# Patient Record
Sex: MALE | Race: BLACK OR AFRICAN AMERICAN | NOT HISPANIC OR LATINO
[De-identification: names, ages, dates, MRNs, and addresses within clinical notes are randomized per-mention and may not be internally consistent; named-entity substitution may affect disease eponyms.]

---

## 2017-01-01 ENCOUNTER — CHARTING TRANS (OUTPATIENT)
Dept: OTHER | Age: 57
End: 2017-01-01

## 2017-03-27 ENCOUNTER — HOSPITAL (OUTPATIENT)
Dept: OTHER | Age: 57
End: 2017-03-27

## 2017-03-27 LAB
ANALYZER ANC (IANC): ABNORMAL
BASOPHILS # BLD: 0 THOUSAND/MCL (ref 0–0.3)
BASOPHILS NFR BLD: 0 %
DIFFERENTIAL METHOD BLD: ABNORMAL
EOSINOPHIL # BLD: 0 THOUSAND/MCL (ref 0.1–0.5)
EOSINOPHIL NFR BLD: 1 %
ERYTHROCYTE [DISTWIDTH] IN BLOOD: 15.1 % (ref 11–15)
HEMATOCRIT: 36.5 % (ref 39–51)
HGB BLD-MCNC: 12 GM/DL (ref 13–17)
LYMPHOCYTES # BLD: 1 THOUSAND/MCL (ref 1–4)
LYMPHOCYTES NFR BLD: 24 %
MCH RBC QN AUTO: 30.1 PG (ref 26–34)
MCHC RBC AUTO-ENTMCNC: 32.9 GM/DL (ref 32–36.5)
MCV RBC AUTO: 91.5 FL (ref 78–100)
MONOCYTES # BLD: 0.6 THOUSAND/MCL (ref 0.3–0.9)
MONOCYTES NFR BLD: 15 %
NEUTROPHILS # BLD: 2.5 THOUSAND/MCL (ref 1.8–7.7)
NEUTROPHILS NFR BLD: 60 %
NEUTS SEG NFR BLD: ABNORMAL %
PERCENT NRBC: ABNORMAL
PLATELET # BLD: 168 THOUSAND/MCL (ref 140–450)
RBC # BLD: 3.99 MILLION/MCL (ref 4.5–5.9)
WBC # BLD: 4.1 THOUSAND/MCL (ref 4.2–11)

## 2017-04-24 LAB
ANALYZER ANC (IANC): ABNORMAL
ANION GAP SERPL CALC-SCNC: 20 MMOL/L (ref 10–20)
BASOPHILS # BLD: 0.1 THOUSAND/MCL (ref 0–0.3)
BASOPHILS NFR BLD: 1 %
BUN SERPL-MCNC: 17 MG/DL (ref 6–20)
BUN/CREAT SERPL: 15 (ref 7–25)
CALCIUM SERPL-MCNC: 8.1 MG/DL (ref 8.4–10.2)
CHLORIDE: 103 MMOL/L (ref 98–107)
CO2 SERPL-SCNC: 22 MMOL/L (ref 21–32)
CREAT SERPL-MCNC: 1.16 MG/DL (ref 0.67–1.17)
DIFFERENTIAL METHOD BLD: ABNORMAL
EOSINOPHIL # BLD: 0 THOUSAND/MCL (ref 0.1–0.5)
EOSINOPHIL NFR BLD: 0 %
ERYTHROCYTE [DISTWIDTH] IN BLOOD: 14.8 % (ref 11–15)
GLUCOSE SERPL-MCNC: 111 MG/DL (ref 65–99)
HEMATOCRIT: 39 % (ref 39–51)
HGB BLD-MCNC: 13.9 GM/DL (ref 13–17)
LIPASE SERPL-CCNC: 506 UNIT/L (ref 73–393)
LYMPHOCYTES # BLD: 0.9 THOUSAND/MCL (ref 1–4)
LYMPHOCYTES NFR BLD: 18 %
MCH RBC QN AUTO: 32.3 PG (ref 26–34)
MCHC RBC AUTO-ENTMCNC: 35.6 GM/DL (ref 32–36.5)
MCV RBC AUTO: 90.7 FL (ref 78–100)
MONOCYTES # BLD: 0.5 THOUSAND/MCL (ref 0.3–0.9)
MONOCYTES NFR BLD: 11 %
NEUTROPHILS # BLD: 3.4 THOUSAND/MCL (ref 1.8–7.7)
NEUTROPHILS NFR BLD: 70 %
NEUTS SEG NFR BLD: ABNORMAL %
PERCENT NRBC: ABNORMAL
PLATELET # BLD: 175 THOUSAND/MCL (ref 140–450)
POTASSIUM SERPL-SCNC: 3.2 MMOL/L (ref 3.4–5.1)
RBC # BLD: 4.3 MILLION/MCL (ref 4.5–5.9)
SODIUM SERPL-SCNC: 142 MMOL/L (ref 135–145)
TROPONIN I SERPL HS-MCNC: 0.08 NG/ML
WBC # BLD: 4.9 THOUSAND/MCL (ref 4.2–11)

## 2017-04-25 ENCOUNTER — CHARTING TRANS (OUTPATIENT)
Dept: OTHER | Age: 57
End: 2017-04-25

## 2017-04-25 ENCOUNTER — DIAGNOSTIC TRANS (OUTPATIENT)
Dept: OTHER | Age: 57
End: 2017-04-25

## 2017-04-25 ENCOUNTER — HOSPITAL (OUTPATIENT)
Dept: OTHER | Age: 57
End: 2017-04-25
Attending: INTERNAL MEDICINE

## 2017-04-25 LAB
ANALYZER ANC (IANC): ABNORMAL
ANION GAP SERPL CALC-SCNC: 16 MMOL/L (ref 10–20)
APTT PPP: 26 SECONDS (ref 22–30)
APTT PPP: NORMAL S
BASOPHILS # BLD: 0 THOUSAND/MCL (ref 0–0.3)
BASOPHILS NFR BLD: 0 %
BNP SERPL-MCNC: 217 PG/ML
BUN SERPL-MCNC: 16 MG/DL (ref 6–20)
BUN/CREAT SERPL: 15 (ref 7–25)
CALCIUM SERPL-MCNC: 8.1 MG/DL (ref 8.4–10.2)
CHLORIDE: 105 MMOL/L (ref 98–107)
CO2 SERPL-SCNC: 25 MMOL/L (ref 21–32)
CREAT SERPL-MCNC: 1.07 MG/DL (ref 0.67–1.17)
DIFFERENTIAL METHOD BLD: ABNORMAL
EOSINOPHIL # BLD: 0.1 THOUSAND/MCL (ref 0.1–0.5)
EOSINOPHIL NFR BLD: 1 %
ERYTHROCYTE [DISTWIDTH] IN BLOOD: 14.6 % (ref 11–15)
GLUCOSE SERPL-MCNC: 116 MG/DL (ref 65–99)
HEMATOCRIT: 35.2 % (ref 39–51)
HGB BLD-MCNC: 12.1 GM/DL (ref 13–17)
INR PPP: 1.1
LYMPHOCYTES # BLD: 0.9 THOUSAND/MCL (ref 1–4)
LYMPHOCYTES NFR BLD: 23 %
MCH RBC QN AUTO: 31.7 PG (ref 26–34)
MCHC RBC AUTO-ENTMCNC: 34.4 GM/DL (ref 32–36.5)
MCV RBC AUTO: 92.1 FL (ref 78–100)
MONOCYTES # BLD: 0.8 THOUSAND/MCL (ref 0.3–0.9)
MONOCYTES NFR BLD: 19 %
NEUTROPHILS # BLD: 2.3 THOUSAND/MCL (ref 1.8–7.7)
NEUTROPHILS NFR BLD: 57 %
NEUTS SEG NFR BLD: ABNORMAL %
PERCENT NRBC: ABNORMAL
PLATELET # BLD: 136 THOUSAND/MCL (ref 140–450)
POTASSIUM SERPL-SCNC: 3.5 MMOL/L (ref 3.4–5.1)
PROTHROMBIN TIME: 11.5 SECONDS (ref 9.7–11.8)
PROTHROMBIN TIME: NORMAL
RBC # BLD: 3.82 MILLION/MCL (ref 4.5–5.9)
SODIUM SERPL-SCNC: 142 MMOL/L (ref 135–145)
TROPONIN I SERPL HS-MCNC: 1.05 NG/ML
TROPONIN I SERPL HS-MCNC: 1.76 NG/ML
WBC # BLD: 4.1 THOUSAND/MCL (ref 4.2–11)

## 2017-04-26 LAB
ANION GAP SERPL CALC-SCNC: 13 MMOL/L (ref 10–20)
BUN SERPL-MCNC: 9 MG/DL (ref 6–20)
BUN/CREAT SERPL: 8 (ref 7–25)
CALCIUM SERPL-MCNC: 7.4 MG/DL (ref 8.4–10.2)
CHLORIDE: 107 MMOL/L (ref 98–107)
CO2 SERPL-SCNC: 24 MMOL/L (ref 21–32)
CREAT SERPL-MCNC: 1.06 MG/DL (ref 0.67–1.17)
GLUCOSE SERPL-MCNC: 111 MG/DL (ref 65–99)
HEMATOCRIT: 32.4 % (ref 39–51)
HGB BLD-MCNC: 11 GM/DL (ref 13–17)
POTASSIUM SERPL-SCNC: 3.1 MMOL/L (ref 3.4–5.1)
SODIUM SERPL-SCNC: 141 MMOL/L (ref 135–145)

## 2017-04-27 ENCOUNTER — DIAGNOSTIC TRANS (OUTPATIENT)
Dept: OTHER | Age: 57
End: 2017-04-27

## 2017-05-04 ENCOUNTER — CHARTING TRANS (OUTPATIENT)
Dept: OTHER | Age: 57
End: 2017-05-04

## 2017-07-07 ENCOUNTER — HOSPITAL (OUTPATIENT)
Dept: OTHER | Age: 57
End: 2017-07-07
Attending: SURGERY

## 2017-07-07 ENCOUNTER — CHARTING TRANS (OUTPATIENT)
Dept: OTHER | Age: 57
End: 2017-07-07

## 2017-07-07 ASSESSMENT — PAIN SCALES - GENERAL: PAINLEVEL_OUTOF10: 0

## 2017-07-13 ENCOUNTER — CHARTING TRANS (OUTPATIENT)
Dept: OTHER | Age: 57
End: 2017-07-13

## 2017-07-16 ENCOUNTER — CHARTING TRANS (OUTPATIENT)
Dept: OTHER | Age: 57
End: 2017-07-16

## 2018-01-01 ENCOUNTER — HOSPITAL (OUTPATIENT)
Dept: OTHER | Age: 58
End: 2018-01-01
Attending: INTERNAL MEDICINE

## 2018-01-01 ENCOUNTER — IMAGING SERVICES (OUTPATIENT)
Dept: OTHER | Age: 58
End: 2018-01-01

## 2018-01-01 ENCOUNTER — CHARTING TRANS (OUTPATIENT)
Dept: OTHER | Age: 58
End: 2018-01-01

## 2018-01-01 ENCOUNTER — HOSPITAL (OUTPATIENT)
Dept: OTHER | Age: 58
End: 2018-01-01
Attending: RADIOLOGY

## 2018-01-01 ENCOUNTER — LAB SERVICES (OUTPATIENT)
Dept: OTHER | Age: 58
End: 2018-01-01

## 2018-01-01 ENCOUNTER — HOSPITAL (OUTPATIENT)
Dept: OTHER | Age: 58
End: 2018-01-01

## 2018-01-01 ENCOUNTER — DIAGNOSTIC TRANS (OUTPATIENT)
Dept: OTHER | Age: 58
End: 2018-01-01

## 2018-01-01 LAB
ADV 40+41 FIB PROT STL QL NAA+PROBE: NOT DETECTED
ALBUMIN SERPL-MCNC: 2.1 GM/DL (ref 3.6–5.1)
ALBUMIN SERPL-MCNC: 2.4 GM/DL (ref 3.6–5.1)
ALBUMIN SERPL-MCNC: 2.6 GM/DL (ref 3.6–5.1)
ALBUMIN SERPL-MCNC: 2.6 GM/DL (ref 3.6–5.1)
ALBUMIN SERPL-MCNC: 3 GM/DL (ref 3.6–5.1)
ALBUMIN SERPL-MCNC: 3 GM/DL (ref 3.6–5.1)
ALBUMIN SERPL-MCNC: 3.2 GM/DL (ref 3.6–5.1)
ALBUMIN/GLOB SERPL: 0.4 {RATIO} (ref 1–2.4)
ALBUMIN/GLOB SERPL: 0.5 {RATIO} (ref 1–2.4)
ALBUMIN/GLOB SERPL: 0.7 {RATIO} (ref 1–2.4)
ALBUMIN/GLOB SERPL: 0.7 {RATIO} (ref 1–2.4)
ALP SERPL-CCNC: 115 UNIT/L (ref 45–117)
ALP SERPL-CCNC: 119 UNIT/L (ref 45–117)
ALP SERPL-CCNC: 120 UNIT/L (ref 45–117)
ALP SERPL-CCNC: 137 UNIT/L (ref 45–117)
ALP SERPL-CCNC: 149 UNIT/L (ref 45–117)
ALP SERPL-CCNC: 149 UNIT/L (ref 45–117)
ALP SERPL-CCNC: 154 UNIT/L (ref 45–117)
ALT SERPL-CCNC: 11 UNIT/L
ALT SERPL-CCNC: 12 UNIT/L
ALT SERPL-CCNC: 13 UNIT/L
ALT SERPL-CCNC: 15 UNIT/L
ALT SERPL-CCNC: 24 UNIT/L
ALT SERPL-CCNC: 32 UNIT/L
ALT SERPL-CCNC: 36 UNIT/L
ANALYZER ANC (IANC): ABNORMAL
ANION GAP SERPL CALC-SCNC: 11 MMOL/L (ref 10–20)
ANION GAP SERPL CALC-SCNC: 12 MMOL/L (ref 10–20)
ANION GAP SERPL CALC-SCNC: 12 MMOL/L (ref 10–20)
ANION GAP SERPL CALC-SCNC: 13 MMOL/L (ref 10–20)
ANION GAP SERPL CALC-SCNC: 13 MMOL/L (ref 10–20)
ANION GAP SERPL CALC-SCNC: 14 MMOL/L (ref 10–20)
ANION GAP SERPL CALC-SCNC: 15 MMOL/L (ref 10–20)
ANION GAP SERPL CALC-SCNC: 16 MMOL/L (ref 10–20)
ANION GAP SERPL CALC-SCNC: 16 MMOL/L (ref 10–20)
ANION GAP SERPL CALC-SCNC: 17 MMOL/L (ref 10–20)
ANION GAP SERPL CALC-SCNC: 18 MMOL/L (ref 10–20)
ANION GAP SERPL CALC-SCNC: 19 MMOL/L (ref 10–20)
ANION GAP SERPL CALC-SCNC: 21 MMOL/L (ref 10–20)
APPEARANCE FLD: ABNORMAL
APPEARANCE FLD: NORMAL
APTT PPP: 25 SECONDS (ref 22–30)
APTT PPP: 26 SECONDS (ref 22–30)
APTT PPP: 28 SECONDS (ref 22–30)
APTT PPP: 28 SECONDS (ref 22–30)
APTT PPP: 29 SECONDS (ref 22–30)
APTT PPP: 29 SECONDS (ref 22–30)
APTT PPP: 31 SECONDS (ref 22–30)
APTT PPP: 34 SECONDS (ref 22–30)
APTT PPP: 38 SECONDS (ref 22–30)
APTT PPP: 38 SECONDS (ref 22–30)
APTT PPP: 54 SECONDS (ref 22–30)
APTT PPP: ABNORMAL S
APTT PPP: NORMAL S
AST SERPL-CCNC: 18 UNIT/L
AST SERPL-CCNC: 19 UNIT/L
AST SERPL-CCNC: 22 UNIT/L
AST SERPL-CCNC: 28 UNIT/L
AST SERPL-CCNC: 34 UNIT/L
AST SERPL-CCNC: 76 UNIT/L
AST SERPL-CCNC: 85 UNIT/L
BASE DEFICIT BLDA-SCNC: 1 MMOL/L (ref 0–2)
BASE DEFICIT BLDA-SCNC: 2 MMOL/L (ref 0–2)
BASE EXCESS BLDA CALC-SCNC: ABNORMAL MMOL/L
BASE EXCESS BLDA CALC-SCNC: ABNORMAL MMOL/L
BASOPHILS # BLD: 0 THOUSAND/MCL (ref 0–0.3)
BASOPHILS # BLD: 0.1 K/MCL (ref 0–0.3)
BASOPHILS # BLD: 0.1 THOUSAND/MCL (ref 0–0.3)
BASOPHILS NFR BLD: 0 %
BASOPHILS NFR BLD: 1 %
BASOPHILS NFR BRONCH MANUAL: ABNORMAL %
BASOPHILS NFR BRONCH MANUAL: NORMAL %
BDY SITE: ABNORMAL
BDY SITE: ABNORMAL
BILIRUB CONJ SERPL-MCNC: 0.1 MG/DL (ref 0–0.2)
BILIRUB SERPL-MCNC: 0.2 MG/DL (ref 0.2–1)
BILIRUB SERPL-MCNC: 0.3 MG/DL (ref 0.2–1)
BILIRUB SERPL-MCNC: 0.3 MG/DL (ref 0.2–1)
BILIRUB SERPL-MCNC: 0.4 MG/DL (ref 0.2–1)
BILIRUB SERPL-MCNC: 0.5 MG/DL (ref 0.2–1)
BILIRUB SERPL-MCNC: 0.6 MG/DL (ref 0.2–1)
BNP SERPL-MCNC: 27 PG/ML
BNP SERPL-MCNC: 31 PG/ML
BNP SERPL-MCNC: 65 PG/ML
BODY TEMPERATURE: 37 DEGREES
BODY TEMPERATURE: 37 DEGREES
BUN SERPL-MCNC: 10 MG/DL (ref 6–20)
BUN SERPL-MCNC: 11 MG/DL (ref 6–20)
BUN SERPL-MCNC: 12 MG/DL (ref 6–20)
BUN SERPL-MCNC: 13 MG/DL (ref 6–20)
BUN SERPL-MCNC: 14 MG/DL (ref 6–20)
BUN SERPL-MCNC: 16 MG/DL (ref 6–20)
BUN SERPL-MCNC: 16 MG/DL (ref 6–20)
BUN SERPL-MCNC: 17 MG/DL (ref 6–20)
BUN SERPL-MCNC: 17 MG/DL (ref 6–20)
BUN SERPL-MCNC: 9 MG/DL (ref 6–20)
BUN SERPL-MCNC: 9 MG/DL (ref 6–20)
BUN/CREAT SERPL: 10 (ref 7–25)
BUN/CREAT SERPL: 11 (ref 7–25)
BUN/CREAT SERPL: 12 (ref 7–25)
BUN/CREAT SERPL: 13 (ref 7–25)
BUN/CREAT SERPL: 13 (ref 7–25)
BUN/CREAT SERPL: 14 (ref 7–25)
BUN/CREAT SERPL: 14 (ref 7–25)
BUN/CREAT SERPL: 15 (ref 7–25)
BUN/CREAT SERPL: 17 (ref 7–25)
BUN/CREAT SERPL: 18 (ref 7–25)
BUN/CREAT SERPL: 18 (ref 7–25)
BUN/CREAT SERPL: 22 (ref 7–25)
BUN/CREAT SERPL: 9 (ref 7–25)
C COLI+JEJ+LAR 16S RRNA STL QL NAA+PROBE: NOT DETECTED
C DIFF TOX B TCDB STL QL NAA+PROBE: NOT DETECTED
C PARVUM+HOMINIS COWP STL QL NAA+PROBE: ABNORMAL
C PARVUM+HOMINIS COWP STL QL NAA+PROBE: NOT DETECTED
CA-I BLDA-SCNC: 13 % (ref 15–23)
CA-I BLDA-SCNC: 14 % (ref 15–23)
CALCIUM SERPL-MCNC: 7.9 MG/DL (ref 8.4–10.2)
CALCIUM SERPL-MCNC: 8 MG/DL (ref 8.4–10.2)
CALCIUM SERPL-MCNC: 8 MG/DL (ref 8.4–10.2)
CALCIUM SERPL-MCNC: 8.2 MG/DL (ref 8.4–10.2)
CALCIUM SERPL-MCNC: 8.4 MG/DL (ref 8.4–10.2)
CALCIUM SERPL-MCNC: 8.5 MG/DL (ref 8.4–10.2)
CALCIUM SERPL-MCNC: 8.5 MG/DL (ref 8.4–10.2)
CALCIUM SERPL-MCNC: 8.6 MG/DL (ref 8.4–10.2)
CALCIUM SERPL-MCNC: 8.7 MG/DL (ref 8.4–10.2)
CALCIUM SERPL-MCNC: 8.8 MG/DL (ref 8.4–10.2)
CALCIUM SERPL-MCNC: 8.8 MG/DL (ref 8.4–10.2)
CALCIUM SERPL-MCNC: 9 MG/DL (ref 8.4–10.2)
CALCIUM SERPL-MCNC: 9 MG/DL (ref 8.4–10.2)
CALCIUM SERPL-MCNC: 9.1 MG/DL (ref 8.4–10.2)
CALCIUM SERPL-MCNC: 9.1 MG/DL (ref 8.4–10.2)
CALCIUM SERPL-MCNC: 9.3 MG/DL (ref 8.4–10.2)
CALCIUM SERPL-MCNC: 9.4 MG/DL (ref 8.4–10.2)
CALCIUM SERPL-MCNC: 9.6 MG/DL (ref 8.4–10.2)
CALCIUM SERPL-MCNC: 9.6 MG/DL (ref 8.4–10.2)
CHLORIDE: 100 MMOL/L (ref 98–107)
CHLORIDE: 100 MMOL/L (ref 98–107)
CHLORIDE: 101 MMOL/L (ref 98–107)
CHLORIDE: 102 MMOL/L (ref 98–107)
CHLORIDE: 103 MMOL/L (ref 98–107)
CHLORIDE: 103 MMOL/L (ref 98–107)
CHLORIDE: 104 MMOL/L (ref 98–107)
CHLORIDE: 105 MMOL/L (ref 98–107)
CHLORIDE: 106 MMOL/L (ref 98–107)
CHLORIDE: 97 MMOL/L (ref 98–107)
CHLORIDE: 98 MMOL/L (ref 98–107)
CO2 SERPL-SCNC: 17 MMOL/L (ref 21–32)
CO2 SERPL-SCNC: 19 MMOL/L (ref 21–32)
CO2 SERPL-SCNC: 20 MMOL/L (ref 21–32)
CO2 SERPL-SCNC: 20 MMOL/L (ref 21–32)
CO2 SERPL-SCNC: 23 MMOL/L (ref 21–32)
CO2 SERPL-SCNC: 24 MMOL/L (ref 21–32)
CO2 SERPL-SCNC: 25 MMOL/L (ref 21–32)
CO2 SERPL-SCNC: 26 MMOL/L (ref 21–32)
CO2 SERPL-SCNC: 26 MMOL/L (ref 21–32)
CO2 SERPL-SCNC: 27 MMOL/L (ref 21–32)
CO2 SERPL-SCNC: 27 MMOL/L (ref 21–32)
COHGB MFR BLD: 1.9 %
COHGB MFR BLD: 2.6 %
CONDITION: ABNORMAL
CREAT SERPL-MCNC: 0.66 MG/DL (ref 0.67–1.17)
CREAT SERPL-MCNC: 0.72 MG/DL (ref 0.67–1.17)
CREAT SERPL-MCNC: 0.72 MG/DL (ref 0.67–1.17)
CREAT SERPL-MCNC: 0.74 MG/DL (ref 0.67–1.17)
CREAT SERPL-MCNC: 0.79 MG/DL (ref 0.67–1.17)
CREAT SERPL-MCNC: 0.8 MG/DL (ref 0.67–1.17)
CREAT SERPL-MCNC: 0.81 MG/DL (ref 0.67–1.17)
CREAT SERPL-MCNC: 0.83 MG/DL (ref 0.67–1.17)
CREAT SERPL-MCNC: 0.9 MG/DL (ref 0.67–1.17)
CREAT SERPL-MCNC: 0.92 MG/DL (ref 0.67–1.17)
CREAT SERPL-MCNC: 0.96 MG/DL (ref 0.67–1.17)
CREAT SERPL-MCNC: 0.96 MG/DL (ref 0.67–1.17)
CREAT SERPL-MCNC: 0.99 MG/DL (ref 0.67–1.17)
CREAT SERPL-MCNC: 1.03 MG/DL (ref 0.67–1.17)
CREAT SERPL-MCNC: 1.12 MG/DL (ref 0.67–1.17)
CREAT SERPL-MCNC: 1.15 MG/DL (ref 0.67–1.17)
CREATININE, POC: 1.1 MG/DL (ref 0.67–1.17)
DIFFERENTIAL METHOD BLD: ABNORMAL
E HISTOLYTICA 18S RRNA STL QL NAA+PROBE: ABNORMAL
E HISTOLYTICA 18S RRNA STL QL NAA+PROBE: NOT DETECTED
EC O157 RFBE GENE STL QL NAA+PROBE: NOT DETECTED
EC STX1 GENE STL QL NAA+PROBE: NOT DETECTED
EC STX2 GENE STL QL NAA+PROBE: NOT DETECTED
EOSINOPHIL # BLD: 0 THOUSAND/MCL (ref 0.1–0.5)
EOSINOPHIL # BLD: 0 THOUSAND/MCL (ref 0.1–0.5)
EOSINOPHIL # BLD: 0.1 K/MCL (ref 0.1–0.5)
EOSINOPHIL # BLD: 0.1 THOUSAND/MCL (ref 0.1–0.5)
EOSINOPHIL # BLD: 0.2 THOUSAND/MCL (ref 0.1–0.5)
EOSINOPHIL # BLD: 0.3 THOUSAND/MCL (ref 0.1–0.5)
EOSINOPHIL # BLD: 0.3 THOUSAND/MCL (ref 0.1–0.5)
EOSINOPHIL # BLD: 0.4 THOUSAND/MCL (ref 0.1–0.5)
EOSINOPHIL # BLD: 0.4 THOUSAND/MCL (ref 0.1–0.5)
EOSINOPHIL NFR BLD: 0 %
EOSINOPHIL NFR BLD: 0 %
EOSINOPHIL NFR BLD: 1 %
EOSINOPHIL NFR BLD: 2 %
EOSINOPHIL NFR BLD: 3 %
EOSINOPHIL NFR BLD: 3 %
EOSINOPHIL NFR BLD: 4 %
EOSINOPHIL NFR BLD: 5 %
EOSINOPHIL NFR BLD: 6 %
EOSINOPHIL NFR BRONCH MANUAL: ABNORMAL %
EOSINOPHIL NFR BRONCH MANUAL: NORMAL %
ERYTHROCYTE [DISTWIDTH] IN BLOOD: 14.4 % (ref 11–15)
ERYTHROCYTE [DISTWIDTH] IN BLOOD: 14.5 % (ref 11–15)
ERYTHROCYTE [DISTWIDTH] IN BLOOD: 14.5 % (ref 11–15)
ERYTHROCYTE [DISTWIDTH] IN BLOOD: 15.9 % (ref 11–15)
ERYTHROCYTE [DISTWIDTH] IN BLOOD: 16.2 % (ref 11–15)
ERYTHROCYTE [DISTWIDTH] IN BLOOD: 16.3 % (ref 11–15)
ERYTHROCYTE [DISTWIDTH] IN BLOOD: 16.3 % (ref 11–15)
ERYTHROCYTE [DISTWIDTH] IN BLOOD: 16.5 % (ref 11–15)
ERYTHROCYTE [DISTWIDTH] IN BLOOD: 16.6 % (ref 11–15)
ERYTHROCYTE [DISTWIDTH] IN BLOOD: 17.5 % (ref 11–15)
ERYTHROCYTE [DISTWIDTH] IN BLOOD: 17.6 % (ref 11–15)
ERYTHROCYTE [DISTWIDTH] IN BLOOD: 19.1 % (ref 11–15)
ERYTHROCYTE [DISTWIDTH] IN BLOOD: 19.3 % (ref 11–15)
ERYTHROCYTE [DISTWIDTH] IN BLOOD: 21.2 % (ref 11–15)
ERYTHROCYTE [DISTWIDTH] IN BLOOD: 21.6 % (ref 11–15)
ERYTHROCYTE [DISTWIDTH] IN BLOOD: 21.9 % (ref 11–15)
ERYTHROCYTE [DISTWIDTH] IN BLOOD: 22.3 % (ref 11–15)
ERYTHROCYTE [DISTWIDTH] IN BLOOD: 22.3 % (ref 11–15)
ETEC ELTA+ESTB GENES STL QL NAA+PROBE: NOT DETECTED
FERRITIN SERPL-MCNC: 178 NG/ML (ref 26–388)
FERRITIN SERPL-MCNC: 52 NG/ML (ref 26–388)
FOLATE SERPL-MCNC: 7.7 NG/ML
FUNGAL CULTURE/SMEAR (FNCS) HL: NORMAL
G LAMBLIA 18S RRNA STL QL NAA+PROBE: ABNORMAL
G LAMBLIA 18S RRNA STL QL NAA+PROBE: NOT DETECTED
GLOBULIN SER-MCNC: 4.4 GM/DL (ref 2–4)
GLOBULIN SER-MCNC: 4.4 GM/DL (ref 2–4)
GLOBULIN SER-MCNC: 4.8 GM/DL (ref 2–4)
GLOBULIN SER-MCNC: 4.8 GM/DL (ref 2–4)
GLOBULIN SER-MCNC: 5.4 GM/DL (ref 2–4)
GLOBULIN SER-MCNC: 5.8 GM/DL (ref 2–4)
GLUCOSE BLDC GLUCOMTR-MCNC: 112 MG/DL (ref 65–99)
GLUCOSE BLDC GLUCOMTR-MCNC: 119 MG/DL (ref 65–99)
GLUCOSE BLDC GLUCOMTR-MCNC: 192 MG/DL (ref 65–99)
GLUCOSE BLDC GLUCOMTR-MCNC: 77 MG/DL (ref 65–99)
GLUCOSE BLDC GLUCOMTR-MCNC: 96 MG/DL (ref 65–99)
GLUCOSE FLD-MCNC: 11 MG/DL
GLUCOSE FLD-MCNC: 88 MG/DL
GLUCOSE SERPL-MCNC: 101 MG/DL (ref 65–99)
GLUCOSE SERPL-MCNC: 103 MG/DL (ref 65–99)
GLUCOSE SERPL-MCNC: 103 MG/DL (ref 65–99)
GLUCOSE SERPL-MCNC: 105 MG/DL (ref 65–99)
GLUCOSE SERPL-MCNC: 106 MG/DL (ref 65–99)
GLUCOSE SERPL-MCNC: 110 MG/DL (ref 65–99)
GLUCOSE SERPL-MCNC: 114 MG/DL (ref 65–99)
GLUCOSE SERPL-MCNC: 116 MG/DL (ref 65–99)
GLUCOSE SERPL-MCNC: 116 MG/DL (ref 65–99)
GLUCOSE SERPL-MCNC: 117 MG/DL (ref 65–99)
GLUCOSE SERPL-MCNC: 119 MG/DL (ref 65–99)
GLUCOSE SERPL-MCNC: 120 MG/DL (ref 65–99)
GLUCOSE SERPL-MCNC: 121 MG/DL (ref 65–99)
GLUCOSE SERPL-MCNC: 137 MG/DL (ref 65–99)
GLUCOSE SERPL-MCNC: 155 MG/DL (ref 65–99)
GLUCOSE SERPL-MCNC: 173 MG/DL (ref 65–99)
GLUCOSE SERPL-MCNC: 95 MG/DL (ref 65–99)
GLUCOSE SERPL-MCNC: 99 MG/DL (ref 65–99)
GLUCOSE SERPL-MCNC: 99 MG/DL (ref 65–99)
HCO3 BLDA-SCNC: 21 MMOL/L (ref 22–28)
HCO3 BLDA-SCNC: 23 MMOL/L (ref 22–28)
HEMATOCRIT: 24.3 % (ref 39–51)
HEMATOCRIT: 25.5 % (ref 39–51)
HEMATOCRIT: 25.9 % (ref 39–51)
HEMATOCRIT: 26.5 % (ref 39–51)
HEMATOCRIT: 27.7 % (ref 39–51)
HEMATOCRIT: 28 % (ref 39–51)
HEMATOCRIT: 28 % (ref 39–51)
HEMATOCRIT: 29 % (ref 39–51)
HEMATOCRIT: 29.2 % (ref 39–51)
HEMATOCRIT: 29.7 % (ref 39–51)
HEMATOCRIT: 32.4 % (ref 39–51)
HEMATOCRIT: 32.9 % (ref 39–51)
HEMATOCRIT: 33 % (ref 39–51)
HEMATOCRIT: 33 % (ref 39–51)
HEMATOCRIT: 33.4 % (ref 39–51)
HEMATOCRIT: 34.1 % (ref 39–51)
HEMATOCRIT: 34.2 % (ref 39–51)
HEMATOCRIT: 34.6 % (ref 39–51)
HEMATOCRIT: 35 % (ref 39–51)
HEMATOCRIT: 36.5 % (ref 39–51)
HEMATOCRIT: 37.6 % (ref 39–51)
HEMATOCRIT: 39 % (ref 39–51)
HEMATOCRIT: 40.4 % (ref 39–51)
HEMOGLOBIN: 9.9 G/DL (ref 13–17)
HGB BLD-MCNC: 10.2 GM/DL (ref 13–17)
HGB BLD-MCNC: 10.3 GM/DL (ref 13–17)
HGB BLD-MCNC: 10.6 GM/DL (ref 13–17)
HGB BLD-MCNC: 10.8 GM/DL (ref 13–17)
HGB BLD-MCNC: 11.1 GM/DL (ref 13–17)
HGB BLD-MCNC: 11.7 GM/DL (ref 13–17)
HGB BLD-MCNC: 12.2 GM/DL (ref 13–17)
HGB BLD-MCNC: 12.7 GM/DL (ref 13–17)
HGB BLD-MCNC: 13.4 GM/DL (ref 13–17)
HGB BLD-MCNC: 6.9 GM/DL (ref 13–17)
HGB BLD-MCNC: 7.4 GM/DL (ref 13–17)
HGB BLD-MCNC: 7.6 GM/DL (ref 13–17)
HGB BLD-MCNC: 7.8 GM/DL (ref 13–17)
HGB BLD-MCNC: 7.9 GM/DL (ref 13–17)
HGB BLD-MCNC: 8 GM/DL (ref 13–17)
HGB BLD-MCNC: 8.4 GM/DL (ref 13–17)
HGB BLD-MCNC: 9.1 GM/DL (ref 13–17)
HGB BLD-MCNC: 9.2 GM/DL (ref 13–17)
HGB BLD-MCNC: 9.5 GM/DL (ref 13–17)
HGB BLD-MCNC: 9.7 GM/DL (ref 13–17)
HGB BLD-MCNC: 9.9 GM/DL (ref 13–17)
HGB BLD-MCNC: 9.9 GM/DL (ref 13–17)
HOROWITZ INDEX BLD+IHG-RTO: 100 %
HOROWITZ INDEX BLD+IHG-RTO: 55 %
HYPOCHROMIA (HYPOC): ABNORMAL
IMM GRANULOCYTES # BLD AUTO: 0 K/MCL (ref 0–0.2)
IMM GRANULOCYTES # BLD AUTO: 0 THOUSAND/MCL (ref 0–0.2)
IMM GRANULOCYTES # BLD AUTO: 0 THOUSAND/MCL (ref 0–0.2)
IMM GRANULOCYTES # BLD AUTO: 0.1 THOUSAND/MCL (ref 0–0.2)
IMM GRANULOCYTES NFR BLD: 0 %
IMM GRANULOCYTES NFR BLD: 1 %
IMMATURE RETIC FRACTION: 23.1 % (ref 1.5–16)
INR PPP: 1.1
INR PPP: 1.2
IRON SATN MFR SERPL: 4 % (ref 15–45)
IRON SATN MFR SERPL: 5 % (ref 15–45)
IRON SERPL-MCNC: 13 MCG/DL (ref 65–175)
IRON SERPL-MCNC: 15 MCG/DL (ref 65–175)
LACTATE BLDV-SCNC: 1.2 MMOL/L (ref 0–2)
LACTATE BLDV-SCNC: 1.6 MMOL/L (ref 0–2)
LACTATE BLDV-SCNC: 1.6 MMOL/L (ref 0–2)
LACTATE BLDV-SCNC: 1.8 MMOL/L (ref 0–2)
LACTATE BLDV-SCNC: 1.9 MMOL/L (ref 0–2)
LACTATE BLDV-SCNC: 2.4 MMOL/L (ref 0–2)
LDH FLD-CCNC: 1526 UNIT/L
LDH FLD-CCNC: 882 UNIT/L
LDH SERPL-CCNC: 316 UNIT/L (ref 86–234)
LYMPHOCYTES # BLD: 0.4 THOUSAND/MCL (ref 1–4)
LYMPHOCYTES # BLD: 0.8 THOUSAND/MCL (ref 1–4)
LYMPHOCYTES # BLD: 0.8 THOUSAND/MCL (ref 1–4)
LYMPHOCYTES # BLD: 0.9 THOUSAND/MCL (ref 1–4)
LYMPHOCYTES # BLD: 1 THOUSAND/MCL (ref 1–4)
LYMPHOCYTES # BLD: 1.1 THOUSAND/MCL (ref 1–4)
LYMPHOCYTES # BLD: 1.2 THOUSAND/MCL (ref 1–4)
LYMPHOCYTES # BLD: 1.3 THOUSAND/MCL (ref 1–4)
LYMPHOCYTES # BLD: 1.3 THOUSAND/MCL (ref 1–4)
LYMPHOCYTES # BLD: 1.4 K/MCL (ref 1–4)
LYMPHOCYTES # BLD: 1.4 THOUSAND/MCL (ref 1–4)
LYMPHOCYTES # BLD: 1.4 THOUSAND/MCL (ref 1–4)
LYMPHOCYTES # BLD: 1.5 THOUSAND/MCL (ref 1–4)
LYMPHOCYTES NFR BLD: 10 %
LYMPHOCYTES NFR BLD: 11 %
LYMPHOCYTES NFR BLD: 12 %
LYMPHOCYTES NFR BLD: 12 %
LYMPHOCYTES NFR BLD: 14 %
LYMPHOCYTES NFR BLD: 15 %
LYMPHOCYTES NFR BLD: 16 %
LYMPHOCYTES NFR BLD: 17 %
LYMPHOCYTES NFR BLD: 25 %
LYMPHOCYTES NFR BLD: 4 %
LYMPHOCYTES NFR BLD: 6 %
LYMPHOCYTES NFR BLD: 8 %
LYMPHOCYTES NFR BLD: 9 %
LYMPHOCYTES NFR BRONCH MANUAL: 22 %
LYMPHOCYTES NFR BRONCH MANUAL: 91 %
MAGNESIUM SERPL-MCNC: 1.6 MG/DL (ref 1.7–2.4)
MAGNESIUM SERPL-MCNC: 1.9 MG/DL (ref 1.7–2.4)
MAGNESIUM SERPL-MCNC: 2 MG/DL (ref 1.7–2.4)
MAGNESIUM SERPL-MCNC: 2.4 MG/DL (ref 1.7–2.4)
MCH RBC QN AUTO: 19.2 PG (ref 26–34)
MCH RBC QN AUTO: 19.9 PG (ref 26–34)
MCH RBC QN AUTO: 20 PG (ref 26–34)
MCH RBC QN AUTO: 20.7 PG (ref 26–34)
MCH RBC QN AUTO: 20.9 PG (ref 26–34)
MCH RBC QN AUTO: 21 PG (ref 26–34)
MCH RBC QN AUTO: 21 PG (ref 26–34)
MCH RBC QN AUTO: 21.5 PG (ref 26–34)
MCH RBC QN AUTO: 21.6 PG (ref 26–34)
MCH RBC QN AUTO: 23 PG (ref 26–34)
MCH RBC QN AUTO: 23.1 PG (ref 26–34)
MCH RBC QN AUTO: 23.3 PG (ref 26–34)
MCH RBC QN AUTO: 23.6 PG (ref 26–34)
MCH RBC QN AUTO: 24.6 PG (ref 26–34)
MCH RBC QN AUTO: 28.1 PG (ref 26–34)
MCH RBC QN AUTO: 29.1 PG (ref 26–34)
MCH RBC QN AUTO: 29.4 PG (ref 26–34)
MCH RBC QN AUTO: 29.9 PG (ref 26–34)
MCH RBC QN AUTO: 30.2 PG (ref 26–34)
MCHC RBC AUTO-ENTMCNC: 28.2 GM/DL (ref 32–36.5)
MCHC RBC AUTO-ENTMCNC: 28.4 GM/DL (ref 32–36.5)
MCHC RBC AUTO-ENTMCNC: 28.6 GM/DL (ref 32–36.5)
MCHC RBC AUTO-ENTMCNC: 29 GM/DL (ref 32–36.5)
MCHC RBC AUTO-ENTMCNC: 29 GM/DL (ref 32–36.5)
MCHC RBC AUTO-ENTMCNC: 29.5 GM/DL (ref 32–36.5)
MCHC RBC AUTO-ENTMCNC: 29.6 GM/DL (ref 32–36.5)
MCHC RBC AUTO-ENTMCNC: 30 GM/DL (ref 32–36.5)
MCHC RBC AUTO-ENTMCNC: 30 GM/DL (ref 32–36.5)
MCHC RBC AUTO-ENTMCNC: 30.3 GM/DL (ref 32–36.5)
MCHC RBC AUTO-ENTMCNC: 30.6 GM/DL (ref 32–36.5)
MCHC RBC AUTO-ENTMCNC: 31.1 GM/DL (ref 32–36.5)
MCHC RBC AUTO-ENTMCNC: 32.4 GM/DL (ref 32–36.5)
MCHC RBC AUTO-ENTMCNC: 32.5 GM/DL (ref 32–36.5)
MCHC RBC AUTO-ENTMCNC: 32.6 GM/DL (ref 32–36.5)
MCHC RBC AUTO-ENTMCNC: 33.2 GM/DL (ref 32–36.5)
MCHC RBC AUTO-ENTMCNC: 33.4 GM/DL (ref 32–36.5)
MCV RBC AUTO: 67.5 FL (ref 78–100)
MCV RBC AUTO: 68.9 FL (ref 78–100)
MCV RBC AUTO: 70.7 FL (ref 78–100)
MCV RBC AUTO: 71.4 FL (ref 78–100)
MCV RBC AUTO: 72.8 FL (ref 78–100)
MCV RBC AUTO: 73 FL (ref 78–100)
MCV RBC AUTO: 73.1 FL (ref 78–100)
MCV RBC AUTO: 73.8 FL (ref 78–100)
MCV RBC AUTO: 73.9 FL (ref 78–100)
MCV RBC AUTO: 75.9 FL (ref 78–100)
MCV RBC AUTO: 77.1 FL (ref 78–100)
MCV RBC AUTO: 77.1 FL (ref 78–100)
MCV RBC AUTO: 77.8 FL (ref 78–100)
MCV RBC AUTO: 79.1 FL (ref 78–100)
MCV RBC AUTO: 86.6 FL (ref 78–100)
MCV RBC AUTO: 89.2 FL (ref 78–100)
MCV RBC AUTO: 90.2 FL (ref 78–100)
MCV RBC AUTO: 90.2 FL (ref 78–100)
MCV RBC AUTO: 90.6 FL (ref 78–100)
MEAN CORPUSCULAR HEMOGLOBIN: 23.3 PG (ref 26–34)
MEAN CORPUSCULAR HGB CONC: 30 G/DL (ref 32–36.5)
MEAN CORPUSCULAR VOLUME: 77.8 FL (ref 78–100)
METHGB MFR BLD: 0.4 %
METHGB MFR BLD: 1 %
MICROCYTOSIS (MICY): ABNORMAL
MONOCYTES # BLD: 0.7 THOUSAND/MCL (ref 0.3–0.9)
MONOCYTES # BLD: 0.8 THOUSAND/MCL (ref 0.3–0.9)
MONOCYTES # BLD: 0.9 THOUSAND/MCL (ref 0.3–0.9)
MONOCYTES # BLD: 0.9 THOUSAND/MCL (ref 0.3–0.9)
MONOCYTES # BLD: 1 THOUSAND/MCL (ref 0.3–0.9)
MONOCYTES # BLD: 1.2 K/MCL (ref 0.3–0.9)
MONOCYTES # BLD: 1.2 THOUSAND/MCL (ref 0.3–0.9)
MONOCYTES # BLD: 1.3 THOUSAND/MCL (ref 0.3–0.9)
MONOCYTES # BLD: 1.4 THOUSAND/MCL (ref 0.3–0.9)
MONOCYTES # BLD: 1.6 THOUSAND/MCL (ref 0.3–0.9)
MONOCYTES # BLD: 1.6 THOUSAND/MCL (ref 0.3–0.9)
MONOCYTES # BLD: 2 THOUSAND/MCL (ref 0.3–0.9)
MONOCYTES # BLD: 2.2 THOUSAND/MCL (ref 0.3–0.9)
MONOCYTES # BLD: 2.2 THOUSAND/MCL (ref 0.3–0.9)
MONOCYTES NFR BLD: 10 %
MONOCYTES NFR BLD: 10 %
MONOCYTES NFR BLD: 11 %
MONOCYTES NFR BLD: 12 %
MONOCYTES NFR BLD: 13 %
MONOCYTES NFR BLD: 14 %
MONOCYTES NFR BLD: 15 %
MONOCYTES NFR BLD: 18 %
MONOCYTES NFR BLD: 18 %
MONOCYTES NFR BLD: 19 %
MONOCYTES NFR BLD: 19 %
MONOCYTES NFR BLD: 8 %
MONOS+MACROS NFR BRONCH MANUAL: 8 %
MONOS+MACROS NFR BRONCH MANUAL: 8 %
MYCOBACTERIA CUL/SMR (CAFBS) HL: NORMAL
NEUTROPHILS # BLD: 3.1 THOUSAND/MCL (ref 1.8–7.7)
NEUTROPHILS # BLD: 4.6 THOUSAND/MCL (ref 1.8–7.7)
NEUTROPHILS # BLD: 4.6 THOUSAND/MCL (ref 1.8–7.7)
NEUTROPHILS # BLD: 4.7 THOUSAND/MCL (ref 1.8–7.7)
NEUTROPHILS # BLD: 5.1 THOUSAND/MCL (ref 1.8–7.7)
NEUTROPHILS # BLD: 5.4 THOUSAND/MCL (ref 1.8–7.7)
NEUTROPHILS # BLD: 5.8 THOUSAND/MCL (ref 1.8–7.7)
NEUTROPHILS # BLD: 6.1 THOUSAND/MCL (ref 1.8–7.7)
NEUTROPHILS # BLD: 6.8 THOUSAND/MCL (ref 1.8–7.7)
NEUTROPHILS # BLD: 7 THOUSAND/MCL (ref 1.8–7.7)
NEUTROPHILS # BLD: 7.5 THOUSAND/MCL (ref 1.8–7.7)
NEUTROPHILS # BLD: 8.1 THOUSAND/MCL (ref 1.8–7.7)
NEUTROPHILS # BLD: 8.4 THOUSAND/MCL (ref 1.8–7.7)
NEUTROPHILS # BLD: 9.1 THOUSAND/MCL (ref 1.8–7.7)
NEUTROPHILS # BLD: 9.4 K/MCL (ref 1.8–7.7)
NEUTROPHILS # BLD: 9.4 THOUSAND/MCL (ref 1.8–7.7)
NEUTROPHILS # BLD: 9.5 THOUSAND/MCL (ref 1.8–7.7)
NEUTROPHILS NFR BLD: 61 %
NEUTROPHILS NFR BLD: 63 %
NEUTROPHILS NFR BLD: 64 %
NEUTROPHILS NFR BLD: 67 %
NEUTROPHILS NFR BLD: 69 %
NEUTROPHILS NFR BLD: 70 %
NEUTROPHILS NFR BLD: 70 %
NEUTROPHILS NFR BLD: 71 %
NEUTROPHILS NFR BLD: 72 %
NEUTROPHILS NFR BLD: 73 %
NEUTROPHILS NFR BLD: 75 %
NEUTROPHILS NFR BLD: 77 %
NEUTROPHILS NFR BLD: 77 %
NEUTROPHILS NFR BLD: 79 %
NEUTROPHILS NFR BLD: 81 %
NEUTS SEG NFR BLD: ABNORMAL
NEUTS SEG NFR BLD: ABNORMAL %
NEUTS SEG NFR FLD: 1 %
NEUTS SEG NFR FLD: 70 %
NOROVIRUS GI RNA STL QL NAA+PROBE: NOT DETECTED
NOROVIRUS GII RNA STL QL NAA+PROBE: NOT DETECTED
NRBC (NRBCRE): 0 /100 WBC
NRBC (NRBCRE): ABNORMAL
NT-PROBNP SERPL-MCNC: 211 PG/ML
NT-PROBNP SERPL-MCNC: 336 PG/ML
NUC CELL # FLD: 1776 /MCL (ref 0–1000)
NUC CELL # FLD: 605 /MCL (ref 0–1000)
OXYHGB MFR BLD: 92.5 % (ref 94–98)
OXYHGB MFR BLD: 95 % (ref 94–98)
PAO2 / FIO2 RATIO (RPFR): 133 (ref 300–500)
PAO2 / FIO2 RATIO (RPFR): 86 (ref 300–500)
PCO2 BLDA: 31 MM HG (ref 35–48)
PCO2 BLDA: 31 MM HG (ref 35–48)
PERCENT NRBC: ABNORMAL
PH BLDA: 7.44 UNIT (ref 7.35–7.45)
PH BLDA: 7.47 UNIT (ref 7.35–7.45)
PH FLD: 7.55 UNIT
PLATELET # BLD: 213 THOUSAND/MCL (ref 140–450)
PLATELET # BLD: 290 THOUSAND/MCL (ref 140–450)
PLATELET # BLD: 322 THOUSAND/MCL (ref 140–450)
PLATELET # BLD: 349 THOUSAND/MCL (ref 140–450)
PLATELET # BLD: 359 THOUSAND/MCL (ref 140–450)
PLATELET # BLD: 361 THOUSAND/MCL (ref 140–450)
PLATELET # BLD: 379 THOUSAND/MCL (ref 140–450)
PLATELET # BLD: 390 THOUSAND/MCL (ref 140–450)
PLATELET # BLD: 395 THOUSAND/MCL (ref 140–450)
PLATELET # BLD: 469 THOUSAND/MCL (ref 140–450)
PLATELET # BLD: 487 THOUSAND/MCL (ref 140–450)
PLATELET # BLD: 531 THOUSAND/MCL (ref 140–450)
PLATELET # BLD: 538 THOUSAND/MCL (ref 140–450)
PLATELET # BLD: 554 THOUSAND/MCL (ref 140–450)
PLATELET # BLD: 600 THOUSAND/MCL (ref 140–450)
PLATELET # BLD: 629 THOUSAND/MCL (ref 140–450)
PLATELET # BLD: 659 THOUSAND/MCL (ref 140–450)
PLATELET # BLD: 705 THOUSAND/MCL (ref 140–450)
PLATELET # BLD: 726 THOUSAND/MCL (ref 140–450)
PLATELET COUNT: 600 K/MCL (ref 140–450)
PO2 BLDA: 73 MM HG (ref 83–108)
PO2 BLDA: 86 MM HG (ref 83–108)
POLYCHROMASIA (POLY): ABNORMAL
POTASSIUM SERPL-SCNC: 3.4 MMOL/L (ref 3.4–5.1)
POTASSIUM SERPL-SCNC: 3.8 MMOL/L (ref 3.4–5.1)
POTASSIUM SERPL-SCNC: 3.8 MMOL/L (ref 3.4–5.1)
POTASSIUM SERPL-SCNC: 4 MMOL/L (ref 3.4–5.1)
POTASSIUM SERPL-SCNC: 4.1 MMOL/L (ref 3.4–5.1)
POTASSIUM SERPL-SCNC: 4.2 MMOL/L (ref 3.4–5.1)
POTASSIUM SERPL-SCNC: 4.3 MMOL/L (ref 3.4–5.1)
POTASSIUM SERPL-SCNC: 4.4 MMOL/L (ref 3.4–5.1)
POTASSIUM SERPL-SCNC: 4.5 MMOL/L (ref 3.4–5.1)
POTASSIUM SERPL-SCNC: 4.5 MMOL/L (ref 3.4–5.1)
POTASSIUM SERPL-SCNC: 4.6 MMOL/L (ref 3.4–5.1)
PROCALCITONIN SERPL IA-MCNC: <0.05 NG/ML
PROT FLD-MCNC: 5.5 GM/DL
PROT FLD-MCNC: 6.1 GM/DL
PROT SERPL-MCNC: 6.9 GM/DL (ref 6.4–8.2)
PROT SERPL-MCNC: 6.9 GM/DL (ref 6.4–8.2)
PROT SERPL-MCNC: 7.4 GM/DL (ref 6.4–8.2)
PROT SERPL-MCNC: 7.4 GM/DL (ref 6.4–8.2)
PROT SERPL-MCNC: 7.6 GM/DL (ref 6.4–8.2)
PROT SERPL-MCNC: 8 GM/DL (ref 6.4–8.2)
PROT SERPL-MCNC: 8.8 GM/DL (ref 6.4–8.2)
PROTHROMBIN TIME: 10.7 SECONDS (ref 9.7–11.8)
PROTHROMBIN TIME: 11.1 SECONDS (ref 9.7–11.8)
PROTHROMBIN TIME: 11.1 SECONDS (ref 9.7–11.8)
PROTHROMBIN TIME: 11.2 SECONDS (ref 9.7–11.8)
PROTHROMBIN TIME: 11.3 SECONDS (ref 9.7–11.8)
PROTHROMBIN TIME: 11.4 SECONDS (ref 9.7–11.8)
PROTHROMBIN TIME: 11.6 SECONDS (ref 9.7–11.8)
PROTHROMBIN TIME: 11.8 SECONDS (ref 9.7–11.8)
PROTHROMBIN TIME: NORMAL
RBC # BLD: 3.57 MILLION/MCL (ref 4.5–5.9)
RBC # BLD: 3.6 MILLION/MCL (ref 4.5–5.9)
RBC # BLD: 3.63 MILLION/MCL (ref 4.5–5.9)
RBC # BLD: 3.65 MILLION/MCL (ref 4.5–5.9)
RBC # BLD: 3.85 MILLION/MCL (ref 4.5–5.9)
RBC # BLD: 3.88 MILLION/MCL (ref 4.5–5.9)
RBC # BLD: 3.95 MILLION/MCL (ref 4.5–5.9)
RBC # BLD: 3.96 MILLION/MCL (ref 4.5–5.9)
RBC # BLD: 4.15 MILLION/MCL (ref 4.5–5.9)
RBC # BLD: 4.21 MILLION/MCL (ref 4.5–5.9)
RBC # BLD: 4.24 MILLION/MCL (ref 4.5–5.9)
RBC # BLD: 4.31 MILLION/MCL (ref 4.5–5.9)
RBC # BLD: 4.37 MILLION/MCL (ref 4.5–5.9)
RBC # BLD: 4.39 MILLION/MCL (ref 4.5–5.9)
RBC # BLD: 4.48 MILLION/MCL (ref 4.5–5.9)
RBC # BLD: 4.52 MILLION/MCL (ref 4.5–5.9)
RBC # BLD: 4.52 MILLION/MCL (ref 4.5–5.9)
RBC # BLD: 4.74 MILLION/MCL (ref 4.5–5.9)
RBC # BLD: 4.99 MILLION/MCL (ref 4.5–5.9)
RED CELL COUNT: 4.24 MIL/MCL (ref 4.5–5.9)
RETICS #: 73 THOUSAND/MCL (ref 10–120)
RETICS/RBC NFR: 2.1 % (ref 0.3–2.5)
RVA VP6 STL QL NAA+PROBE: NOT DETECTED
SALMONELLA SP INVA+FLIC STL QL NAA+PROBE: NOT DETECTED
SAO2 % BLDA: 95 % (ref 95–99)
SAO2 % BLDA: 98 % (ref 95–99)
SERVICE CMNT-IMP: ABNORMAL
SHIGELLA SP+EIEC IPAH STL QL NAA+PROBE: NOT DETECTED
SODIUM SERPL-SCNC: 129 MMOL/L (ref 135–145)
SODIUM SERPL-SCNC: 132 MMOL/L (ref 135–145)
SODIUM SERPL-SCNC: 133 MMOL/L (ref 135–145)
SODIUM SERPL-SCNC: 134 MMOL/L (ref 135–145)
SODIUM SERPL-SCNC: 135 MMOL/L (ref 135–145)
SODIUM SERPL-SCNC: 136 MMOL/L (ref 135–145)
SODIUM SERPL-SCNC: 137 MMOL/L (ref 135–145)
SODIUM SERPL-SCNC: 138 MMOL/L (ref 135–145)
SODIUM SERPL-SCNC: 139 MMOL/L (ref 135–145)
SODIUM SERPL-SCNC: 140 MMOL/L (ref 135–145)
SODIUM SERPL-SCNC: 140 MMOL/L (ref 135–145)
SPECIMEN SOURCE FLD: NORMAL
SPECIMEN SOURCE: ABNORMAL
SPECIMEN SOURCE: NORMAL
SPECIMEN SOURCE: NORMAL
SPECIMEN VOL FLD: 1000 ML
SPECIMEN VOL FLD: 1125 ML
TIBC SERPL-MCNC: 325 MCG/DL (ref 250–450)
TIBC SERPL-MCNC: 329 MCG/DL (ref 250–450)
TROPONIN I SERPL HS-MCNC: 0.2 NG/ML
TROPONIN I SERPL HS-MCNC: 0.59 NG/ML
TROPONIN I SERPL HS-MCNC: 12.24 NG/ML
TROPONIN I SERPL HS-MCNC: 2.38 NG/ML
TROPONIN I SERPL HS-MCNC: 5.14 NG/ML
TROPONIN I SERPL HS-MCNC: 6.87 NG/ML
TROPONIN I SERPL HS-MCNC: 9.44 NG/ML
TROPONIN I SERPL HS-MCNC: <0.02 NG/ML
TSH SERPL-ACNC: 2.79 MCUNIT/ML (ref 0.35–5)
TSH SERPL-ACNC: 3.46 MCUNIT/ML (ref 0.35–5)
VANCOMYCIN TROUGH SERPL-MCNC: 11.2 MCG/ML (ref 10–20)
VANCOMYCIN TROUGH SERPL-MCNC: 7.1 MCG/ML (ref 10–20)
VANCOMYCIN TROUGH SERPL-MCNC: 9.3 MCG/ML (ref 10–20)
VIBRIO CHOL TOXIN CTXA STL QL NAA+PROBE: NOT DETECTED
VIT B12 SERPL-MCNC: 287 PG/ML (ref 211–911)
WBC # BLD: 10.5 THOUSAND/MCL (ref 4.2–11)
WBC # BLD: 10.5 THOUSAND/MCL (ref 4.2–11)
WBC # BLD: 11.3 THOUSAND/MCL (ref 4.2–11)
WBC # BLD: 11.9 THOUSAND/MCL (ref 4.2–11)
WBC # BLD: 12.1 THOUSAND/MCL (ref 4.2–11)
WBC # BLD: 12.1 THOUSAND/MCL (ref 4.2–11)
WBC # BLD: 5.2 THOUSAND/MCL (ref 4.2–11)
WBC # BLD: 6.6 THOUSAND/MCL (ref 4.2–11)
WBC # BLD: 6.6 THOUSAND/MCL (ref 4.2–11)
WBC # BLD: 7.1 THOUSAND/MCL (ref 4.2–11)
WBC # BLD: 7.3 THOUSAND/MCL (ref 4.2–11)
WBC # BLD: 7.5 THOUSAND/MCL (ref 4.2–11)
WBC # BLD: 7.5 THOUSAND/MCL (ref 4.2–11)
WBC # BLD: 7.6 THOUSAND/MCL (ref 4.2–11)
WBC # BLD: 8.1 THOUSAND/MCL (ref 4.2–11)
WBC # BLD: 8.4 THOUSAND/MCL (ref 4.2–11)
WBC # BLD: 8.7 THOUSAND/MCL (ref 4.2–11)
WBC # BLD: 9.5 THOUSAND/MCL (ref 4.2–11)
WBC # BLD: 9.9 THOUSAND/MCL (ref 4.2–11)
WHITE BLOOD COUNT: 12.1 K/MCL (ref 4.2–11)

## 2018-11-01 VITALS
BODY MASS INDEX: 24.2 KG/M2 | DIASTOLIC BLOOD PRESSURE: 64 MMHG | HEART RATE: 80 BPM | SYSTOLIC BLOOD PRESSURE: 108 MMHG | HEIGHT: 70 IN | WEIGHT: 169 LBS

## 2018-11-02 VITALS
DIASTOLIC BLOOD PRESSURE: 84 MMHG | HEART RATE: 72 BPM | SYSTOLIC BLOOD PRESSURE: 136 MMHG | BODY MASS INDEX: 25.05 KG/M2 | WEIGHT: 175 LBS | HEIGHT: 70 IN

## 2018-11-03 VITALS
RESPIRATION RATE: 18 BRPM | WEIGHT: 177 LBS | DIASTOLIC BLOOD PRESSURE: 88 MMHG | SYSTOLIC BLOOD PRESSURE: 142 MMHG | HEIGHT: 70 IN | HEART RATE: 74 BPM | BODY MASS INDEX: 25.34 KG/M2

## 2018-11-03 VITALS
BODY MASS INDEX: 25.2 KG/M2 | HEART RATE: 90 BPM | SYSTOLIC BLOOD PRESSURE: 142 MMHG | DIASTOLIC BLOOD PRESSURE: 97 MMHG | WEIGHT: 176 LBS | HEIGHT: 70 IN

## 2018-11-04 VITALS
HEART RATE: 84 BPM | DIASTOLIC BLOOD PRESSURE: 80 MMHG | WEIGHT: 182 LBS | HEIGHT: 71 IN | SYSTOLIC BLOOD PRESSURE: 140 MMHG | BODY MASS INDEX: 25.48 KG/M2

## 2021-07-13 ENCOUNTER — APPOINTMENT (OUTPATIENT)
Dept: URBAN - METROPOLITAN AREA CLINIC 265 | Age: 61
Setting detail: DERMATOLOGY
End: 2021-07-29

## 2021-07-13 VITALS — HEIGHT: 69 IN

## 2021-07-13 DIAGNOSIS — K13.0 DISEASES OF LIPS: ICD-10-CM

## 2021-07-13 DIAGNOSIS — L20.89 OTHER ATOPIC DERMATITIS: ICD-10-CM

## 2021-07-13 PROBLEM — L20.84 INTRINSIC (ALLERGIC) ECZEMA: Status: ACTIVE | Noted: 2021-07-13

## 2021-07-13 PROCEDURE — OTHER MIPS QUALITY: OTHER

## 2021-07-13 PROCEDURE — 99203 OFFICE O/P NEW LOW 30 MIN: CPT | Mod: 25

## 2021-07-13 PROCEDURE — OTHER MEDICATION COUNSELING: OTHER

## 2021-07-13 PROCEDURE — OTHER COUNSELING: OTHER

## 2021-07-13 PROCEDURE — OTHER INTRALESIONAL KENALOG: OTHER

## 2021-07-13 PROCEDURE — OTHER PRESCRIPTION: OTHER

## 2021-07-13 PROCEDURE — 11900 INJECT SKIN LESIONS </W 7: CPT

## 2021-07-13 PROCEDURE — OTHER PRESCRIPTION MEDICATION MANAGEMENT: OTHER

## 2021-07-13 RX ORDER — CLOTRIMAZOLE 1 G/100G
CREAM TOPICAL
Qty: 1 | Refills: 0 | Status: ERX | COMMUNITY
Start: 2021-07-13

## 2021-07-13 ASSESSMENT — LOCATION DETAILED DESCRIPTION DERM
LOCATION DETAILED: RIGHT ULNAR DORSAL HAND
LOCATION DETAILED: LEFT ORAL COMMISSURE

## 2021-07-13 ASSESSMENT — LOCATION SIMPLE DESCRIPTION DERM
LOCATION SIMPLE: RIGHT HAND
LOCATION SIMPLE: LEFT LIP

## 2021-07-13 ASSESSMENT — LOCATION ZONE DERM
LOCATION ZONE: LIP
LOCATION ZONE: HAND

## 2021-07-13 NOTE — PROCEDURE: MEDICATION COUNSELING
Xelarletz Pregnancy And Lactation Text: This medication is Pregnancy Category D and is not considered safe during pregnancy.  The risk during breast feeding is also uncertain.

## 2021-07-13 NOTE — PROCEDURE: INTRALESIONAL KENALOG
Consent: The risks of atrophy were reviewed with the patient.  This was performed due to greater than 1 year of failure with topical corticosteroids.

## 2021-07-13 NOTE — PROCEDURE: PRESCRIPTION MEDICATION MANAGEMENT
Render In Strict Bullet Format?: No
Plan: Return in 4 weeks
Detail Level: Zone
Initiate Treatment: Clotrimazol cream 1%
Continue Regimen: Clobetasol cream
Detail Level: Detailed
Initiate Treatment: Kenalog injection, and Amlactin cream over the counter

## 2021-08-13 ENCOUNTER — APPOINTMENT (OUTPATIENT)
Dept: URBAN - METROPOLITAN AREA CLINIC 265 | Age: 61
Setting detail: DERMATOLOGY
End: 2021-08-14

## 2021-08-13 DIAGNOSIS — L20.89 OTHER ATOPIC DERMATITIS: ICD-10-CM

## 2021-08-13 DIAGNOSIS — K13.0 DISEASES OF LIPS: ICD-10-CM

## 2021-08-13 PROBLEM — L20.84 INTRINSIC (ALLERGIC) ECZEMA: Status: ACTIVE | Noted: 2021-08-13

## 2021-08-13 PROCEDURE — OTHER PRESCRIPTION MEDICATION MANAGEMENT: OTHER

## 2021-08-13 PROCEDURE — OTHER INTRALESIONAL KENALOG: OTHER

## 2021-08-13 PROCEDURE — 99213 OFFICE O/P EST LOW 20 MIN: CPT | Mod: 25

## 2021-08-13 PROCEDURE — OTHER COUNSELING: OTHER

## 2021-08-13 PROCEDURE — 11900 INJECT SKIN LESIONS </W 7: CPT

## 2021-08-13 ASSESSMENT — LOCATION DETAILED DESCRIPTION DERM
LOCATION DETAILED: RIGHT ULNAR DORSAL HAND
LOCATION DETAILED: LEFT ORAL COMMISSURE

## 2021-08-13 ASSESSMENT — LOCATION SIMPLE DESCRIPTION DERM
LOCATION SIMPLE: RIGHT HAND
LOCATION SIMPLE: LEFT LIP

## 2021-08-13 ASSESSMENT — LOCATION ZONE DERM
LOCATION ZONE: HAND
LOCATION ZONE: LIP

## 2021-08-13 NOTE — PROCEDURE: PRESCRIPTION MEDICATION MANAGEMENT
Continue Regimen: Clobetasol cream
Detail Level: Zone
Continue Regimen: Clotrimazole cream as previously directed.
Plan: Continue with ILK to the affected areas today.\\nReturn in 4 weeks
Initiate Treatment: Amlactin cream over the counter
Plan: Patient overall improved. Will re-evaluate as needed.
Render In Strict Bullet Format?: No

## 2021-10-15 ENCOUNTER — RX ONLY (RX ONLY)
Age: 61
End: 2021-10-15

## 2021-10-15 RX ORDER — CLOTRIMAZOLE 1 G/100G
CREAM TOPICAL
Qty: 30 | Refills: 0 | Status: ERX

## 2024-07-24 NOTE — PROCEDURE: MEDICATION COUNSELING
Sepsis protocol triggered. Pt already on Vanco and Rocephin. Last lactic draw 7/22 was 0.9. Notified provider. Okay to NOT run protocol per hospitalist.. VSS.     Debbie Fagan RN.    Doxycycline Counseling:  Patient counseled regarding possible photosensitivity and increased risk for sunburn.  Patient instructed to avoid sunlight, if possible.  When exposed to sunlight, patients should wear protective clothing, sunglasses, and sunscreen.  The patient was instructed to call the office immediately if the following severe adverse effects occur:  hearing changes, easy bruising/bleeding, severe headache, or vision changes.  The patient verbalized understanding of the proper use and possible adverse effects of doxycycline.  All of the patient's questions and concerns were addressed.